# Patient Record
Sex: MALE | Race: WHITE | Employment: UNEMPLOYED | URBAN - METROPOLITAN AREA
[De-identification: names, ages, dates, MRNs, and addresses within clinical notes are randomized per-mention and may not be internally consistent; named-entity substitution may affect disease eponyms.]

---

## 2019-05-29 ENCOUNTER — APPOINTMENT (OUTPATIENT)
Dept: CT IMAGING | Age: 49
End: 2019-05-29
Attending: EMERGENCY MEDICINE
Payer: SELF-PAY

## 2019-05-29 ENCOUNTER — HOSPITAL ENCOUNTER (EMERGENCY)
Age: 49
Discharge: HOME | End: 2019-05-29
Attending: EMERGENCY MEDICINE | Admitting: EMERGENCY MEDICINE
Payer: SELF-PAY

## 2019-05-29 VITALS
TEMPERATURE: 98.4 F | OXYGEN SATURATION: 98 % | SYSTOLIC BLOOD PRESSURE: 142 MMHG | HEART RATE: 64 BPM | BODY MASS INDEX: 24.44 KG/M2 | WEIGHT: 165 LBS | HEIGHT: 69 IN | DIASTOLIC BLOOD PRESSURE: 80 MMHG | RESPIRATION RATE: 16 BRPM

## 2019-05-29 DIAGNOSIS — K40.90 LEFT INGUINAL HERNIA: Primary | ICD-10-CM

## 2019-05-29 DIAGNOSIS — N28.89 RIGHT RENAL MASS: ICD-10-CM

## 2019-05-29 LAB
ALBUMIN SERPL-MCNC: 3.4 G/DL (ref 3.5–5)
ALBUMIN/GLOB SERPL: 1 {RATIO} (ref 1.2–3.5)
ALP SERPL-CCNC: 51 U/L (ref 50–136)
ALT SERPL-CCNC: 16 U/L (ref 12–65)
ANION GAP SERPL CALC-SCNC: 7 MMOL/L (ref 7–16)
AST SERPL-CCNC: 11 U/L (ref 15–37)
BASOPHILS # BLD: 0.1 K/UL (ref 0–0.2)
BASOPHILS NFR BLD: 1 % (ref 0–2)
BILIRUB SERPL-MCNC: 0.3 MG/DL (ref 0.2–1.1)
BUN SERPL-MCNC: 13 MG/DL (ref 6–23)
CALCIUM SERPL-MCNC: 8.5 MG/DL (ref 8.3–10.4)
CHLORIDE SERPL-SCNC: 105 MMOL/L (ref 98–107)
CO2 SERPL-SCNC: 29 MMOL/L (ref 21–32)
CREAT SERPL-MCNC: 0.82 MG/DL (ref 0.8–1.5)
DIFFERENTIAL METHOD BLD: NORMAL
EOSINOPHIL # BLD: 0.2 K/UL (ref 0–0.8)
EOSINOPHIL NFR BLD: 3 % (ref 0.5–7.8)
ERYTHROCYTE [DISTWIDTH] IN BLOOD BY AUTOMATED COUNT: 12.5 % (ref 11.9–14.6)
GLOBULIN SER CALC-MCNC: 3.5 G/DL (ref 2.3–3.5)
GLUCOSE SERPL-MCNC: 95 MG/DL (ref 65–100)
HCT VFR BLD AUTO: 42.3 % (ref 41.1–50.3)
HGB BLD-MCNC: 14.1 G/DL (ref 13.6–17.2)
IMM GRANULOCYTES # BLD AUTO: 0 K/UL (ref 0–0.5)
IMM GRANULOCYTES NFR BLD AUTO: 0 % (ref 0–5)
LIPASE SERPL-CCNC: 142 U/L (ref 73–393)
LYMPHOCYTES # BLD: 2.3 K/UL (ref 0.5–4.6)
LYMPHOCYTES NFR BLD: 34 % (ref 13–44)
MCH RBC QN AUTO: 29.5 PG (ref 26.1–32.9)
MCHC RBC AUTO-ENTMCNC: 33.3 G/DL (ref 31.4–35)
MCV RBC AUTO: 88.5 FL (ref 79.6–97.8)
MONOCYTES # BLD: 0.5 K/UL (ref 0.1–1.3)
MONOCYTES NFR BLD: 8 % (ref 4–12)
NEUTS SEG # BLD: 3.7 K/UL (ref 1.7–8.2)
NEUTS SEG NFR BLD: 54 % (ref 43–78)
NRBC # BLD: 0 K/UL (ref 0–0.2)
PLATELET # BLD AUTO: 241 K/UL (ref 150–450)
PMV BLD AUTO: 9.5 FL (ref 9.4–12.3)
POTASSIUM SERPL-SCNC: 3.7 MMOL/L (ref 3.5–5.1)
PROT SERPL-MCNC: 6.9 G/DL (ref 6.3–8.2)
RBC # BLD AUTO: 4.78 M/UL (ref 4.23–5.6)
SODIUM SERPL-SCNC: 141 MMOL/L (ref 136–145)
WBC # BLD AUTO: 6.8 K/UL (ref 4.3–11.1)

## 2019-05-29 PROCEDURE — 74011636320 HC RX REV CODE- 636/320: Performed by: EMERGENCY MEDICINE

## 2019-05-29 PROCEDURE — 83690 ASSAY OF LIPASE: CPT

## 2019-05-29 PROCEDURE — 80053 COMPREHEN METABOLIC PANEL: CPT

## 2019-05-29 PROCEDURE — 81003 URINALYSIS AUTO W/O SCOPE: CPT | Performed by: EMERGENCY MEDICINE

## 2019-05-29 PROCEDURE — 74177 CT ABD & PELVIS W/CONTRAST: CPT

## 2019-05-29 PROCEDURE — 74011000258 HC RX REV CODE- 258: Performed by: EMERGENCY MEDICINE

## 2019-05-29 PROCEDURE — 99284 EMERGENCY DEPT VISIT MOD MDM: CPT | Performed by: EMERGENCY MEDICINE

## 2019-05-29 PROCEDURE — 85025 COMPLETE CBC W/AUTO DIFF WBC: CPT

## 2019-05-29 RX ORDER — DIVALPROEX SODIUM 500 MG/1
TABLET, DELAYED RELEASE ORAL 3 TIMES DAILY
COMMUNITY

## 2019-05-29 RX ORDER — SODIUM CHLORIDE 0.9 % (FLUSH) 0.9 %
10 SYRINGE (ML) INJECTION
Status: COMPLETED | OUTPATIENT
Start: 2019-05-29 | End: 2019-05-29

## 2019-05-29 RX ADMIN — IOPAMIDOL 100 ML: 755 INJECTION, SOLUTION INTRAVENOUS at 18:31

## 2019-05-29 RX ADMIN — DIATRIZOATE MEGLUMINE AND DIATRIZOATE SODIUM 15 ML: 660; 100 LIQUID ORAL; RECTAL at 17:10

## 2019-05-29 RX ADMIN — SODIUM CHLORIDE 100 ML: 900 INJECTION, SOLUTION INTRAVENOUS at 18:31

## 2019-05-29 RX ADMIN — Medication 10 ML: at 18:31

## 2019-05-29 NOTE — ED TRIAGE NOTES
Pt states he has a knot in left groin for about one week. States it is about the size of an egg now and causing pain into leg.

## 2019-05-29 NOTE — ED PROVIDER NOTES
57-year-old male presents with complaint of left inguinal mass. States that it enlarges with standing. Patient denies history of inguinal hernia in the past.  States it is been present over the past several months. Denies nausea, vomiting, fever, chills, dizziness, weakness, dysuria, hematuria, flank pain. Denies testicular swelling or pain. The history is provided by the patient. No  was used. Abdominal Pain    This is a new problem. The current episode started more than 2 days ago. The problem occurs constantly. The problem has not changed since onset. The pain is located in the LLQ. The quality of the pain is cramping. The pain is at a severity of 2/10. The pain is mild. Pertinent negatives include no anorexia, no fever, no belching, no diarrhea, no flatus, no hematochezia, no melena, no nausea, no vomiting, no constipation, no dysuria, no frequency, no hematuria, no headaches, no arthralgias, no myalgias, no trauma, no chest pain, no testicular pain and no back pain. Nothing worsens the pain. The pain is relieved by nothing. Past Medical History:   Diagnosis Date    Psychiatric disorder        History reviewed. No pertinent surgical history. History reviewed. No pertinent family history.     Social History     Socioeconomic History    Marital status: SINGLE     Spouse name: Not on file    Number of children: Not on file    Years of education: Not on file    Highest education level: Not on file   Occupational History    Not on file   Social Needs    Financial resource strain: Not on file    Food insecurity:     Worry: Not on file     Inability: Not on file    Transportation needs:     Medical: Not on file     Non-medical: Not on file   Tobacco Use    Smoking status: Current Every Day Smoker   Substance and Sexual Activity    Alcohol use: Not Currently    Drug use: Yes     Types: Marijuana    Sexual activity: Not on file   Lifestyle    Physical activity: Days per week: Not on file     Minutes per session: Not on file    Stress: Not on file   Relationships    Social connections:     Talks on phone: Not on file     Gets together: Not on file     Attends Yazidism service: Not on file     Active member of club or organization: Not on file     Attends meetings of clubs or organizations: Not on file     Relationship status: Not on file    Intimate partner violence:     Fear of current or ex partner: Not on file     Emotionally abused: Not on file     Physically abused: Not on file     Forced sexual activity: Not on file   Other Topics Concern    Not on file   Social History Narrative    Not on file         ALLERGIES: Patient has no known allergies. Review of Systems   Constitutional: Negative for fatigue and fever. HENT: Negative. Respiratory: Negative for cough and shortness of breath. Cardiovascular: Negative for chest pain and palpitations. Gastrointestinal: Positive for abdominal pain. Negative for anorexia, blood in stool, constipation, diarrhea, flatus, hematochezia, melena, nausea, rectal pain and vomiting. Genitourinary: Negative for dysuria, frequency, hematuria and testicular pain. Musculoskeletal: Negative for arthralgias, back pain and myalgias. Skin: Negative for color change, pallor and rash. Neurological: Negative for weakness, light-headedness and headaches. Vitals:    05/29/19 1344   BP: 134/83   Pulse: 72   Resp: 16   Temp: 98.6 °F (37 °C)   SpO2: 96%   Weight: 74.8 kg (165 lb)   Height: 5' 9\" (1.753 m)            Physical Exam   Constitutional: He is oriented to person, place, and time. He appears well-developed. Patient well-appearing and in no acute distress. HENT:   Head: Normocephalic. Mouth/Throat: Oropharynx is clear and moist.   MMM. Eyes: Pupils are equal, round, and reactive to light. Conjunctivae and EOM are normal.   Neck: Neck supple. No JVD present.    Cardiovascular: Normal rate, regular rhythm and normal heart sounds. Radial pulses 2+ and equal bilaterally. Pulmonary/Chest: Effort normal and breath sounds normal.   Abdominal: Soft. Bowel sounds are normal. He exhibits no distension. There is no tenderness. Reducible left inguinal hernia. No overlying color change. No significant tenderness to palpation. Soft, NTND. No CVAT. No rebound or guarding. Genitourinary:   Genitourinary Comments: Patient deferred. Musculoskeletal: Normal range of motion. He exhibits no edema. Neurological: He is alert and oriented to person, place, and time. No cranial nerve deficit or sensory deficit. Strength 5/5 throughout. Normal sensory exam.     Skin: Skin is warm and dry. No rash. Psychiatric: He has a normal mood and affect. Nursing note and vitals reviewed. MDM  Number of Diagnoses or Management Options  Left inguinal hernia: new and requires workup  Right renal mass: new and requires workup  Diagnosis management comments: Vital signs stable. All labs normal.  UA negative for UTI. CT abdomen and pelvis negative for obstruction, inguinal hernia. CT does show cased evidence of a right renal lesion. Called radiology in regards to this. States the patient will need outpatient follow-up with urology in 2-3 months. Patient given strict instructions to follow up with urology, general surgery, and primary care physician. Urology referral line to be contacted to ensure follow-up. Patient given parenteral CT scan.          Amount and/or Complexity of Data Reviewed  Clinical lab tests: ordered and reviewed  Tests in the radiology section of CPT®: ordered and reviewed  Tests in the medicine section of CPT®: reviewed and ordered  Review and summarize past medical records: yes  Independent visualization of images, tracings, or specimens: yes    Risk of Complications, Morbidity, and/or Mortality  Presenting problems: moderate  Diagnostic procedures: low  Management options: low    Patient Progress  Patient progress: stable    ED Course as of May 29 1940   Wed May 29, 2019   1912 CT abd/pelvis IMPRESSION:  1. Complex lower pole right renal lesion incompletely characterized. A benign or  malignant etiology could account for this appearance. Urological consultation is  recommended. A CT scan using a renal mass protocol may be beneficial if there is  further clinical concern. 2. No evidence of acute abnormality. [DF]      ED Course User Index  [DF] Quan Queen MD       Procedures      Results Include:    Recent Results (from the past 24 hour(s))   CBC WITH AUTOMATED DIFF    Collection Time: 05/29/19  3:22 PM   Result Value Ref Range    WBC 6.8 4.3 - 11.1 K/uL    RBC 4.78 4.23 - 5.6 M/uL    HGB 14.1 13.6 - 17.2 g/dL    HCT 42.3 41.1 - 50.3 %    MCV 88.5 79.6 - 97.8 FL    MCH 29.5 26.1 - 32.9 PG    MCHC 33.3 31.4 - 35.0 g/dL    RDW 12.5 11.9 - 14.6 %    PLATELET 671 413 - 466 K/uL    MPV 9.5 9.4 - 12.3 FL    ABSOLUTE NRBC 0.00 0.0 - 0.2 K/uL    DF AUTOMATED      NEUTROPHILS 54 43 - 78 %    LYMPHOCYTES 34 13 - 44 %    MONOCYTES 8 4.0 - 12.0 %    EOSINOPHILS 3 0.5 - 7.8 %    BASOPHILS 1 0.0 - 2.0 %    IMMATURE GRANULOCYTES 0 0.0 - 5.0 %    ABS. NEUTROPHILS 3.7 1.7 - 8.2 K/UL    ABS. LYMPHOCYTES 2.3 0.5 - 4.6 K/UL    ABS. MONOCYTES 0.5 0.1 - 1.3 K/UL    ABS. EOSINOPHILS 0.2 0.0 - 0.8 K/UL    ABS. BASOPHILS 0.1 0.0 - 0.2 K/UL    ABS. IMM.  GRANS. 0.0 0.0 - 0.5 K/UL   METABOLIC PANEL, COMPREHENSIVE    Collection Time: 05/29/19  3:22 PM   Result Value Ref Range    Sodium 141 136 - 145 mmol/L    Potassium 3.7 3.5 - 5.1 mmol/L    Chloride 105 98 - 107 mmol/L    CO2 29 21 - 32 mmol/L    Anion gap 7 7 - 16 mmol/L    Glucose 95 65 - 100 mg/dL    BUN 13 6 - 23 MG/DL    Creatinine 0.82 0.8 - 1.5 MG/DL    GFR est AA >60 >60 ml/min/1.73m2    GFR est non-AA >60 >60 ml/min/1.73m2    Calcium 8.5 8.3 - 10.4 MG/DL    Bilirubin, total 0.3 0.2 - 1.1 MG/DL    ALT (SGPT) 16 12 - 65 U/L    AST (SGOT) 11 (L) 15 - 37 U/L    Alk. phosphatase 51 50 - 136 U/L    Protein, total 6.9 6.3 - 8.2 g/dL    Albumin 3.4 (L) 3.5 - 5.0 g/dL    Globulin 3.5 2.3 - 3.5 g/dL    A-G Ratio 1.0 (L) 1.2 - 3.5     LIPASE    Collection Time: 05/29/19  3:22 PM   Result Value Ref Range    Lipase 142 73 - 393 U/L              Chip Kruegr MD; 5/29/2019 @5:56 PM Voice dictation software was used during the making of this note. This software is not perfect and grammatical and other typographical errors may be present.   This note has not been proofread for errors.  ===================================================================

## 2019-05-29 NOTE — ED NOTES
I have reviewed discharge instructions with the patient. The patient verbalized understanding. Patient left ED via Discharge Method: ambulatory to Home with himself. Opportunity for questions and clarification provided. Patient given 0 scripts. To continue your aftercare when you leave the hospital, you may receive an automated call from our care team to check in on how you are doing. This is a free service and part of our promise to provide the best care and service to meet your aftercare needs.  If you have questions, or wish to unsubscribe from this service please call [phone]. Thank you for Choosing our [facility].

## 2019-05-29 NOTE — DISCHARGE INSTRUCTIONS
Follow-up with General surgery in regards to left inguinal hernia. Follow-up with urology in regards to right renal lesion. Will need repeat CT urogram renal mass protocol and 2-3 months. Return to ED if symptoms worsen or progress in any way. Hernia: Care Instructions  Your Care Instructions    A hernia develops when tissue bulges through a weak spot in the wall of your belly. The groin area and the navel are common areas for a hernia. A hernia can also develop near the area of a surgery you had before. Pressure from lifting, straining, or coughing can tear the weak area, causing the hernia to bulge and be painful. If you cannot push a hernia back into place, the tissue may become trapped outside the belly wall. If the hernia gets twisted and loses its blood supply, it will swell and die. This is called a strangulated hernia. It usually causes a lot of pain. It needs treatment right away. Some hernias need to be repaired to prevent a strangulated hernia. If your hernia causes symptoms or is large, you may need surgery. Follow-up care is a key part of your treatment and safety. Be sure to make and go to all appointments, and call your doctor if you are having problems. It's also a good idea to know your test results and keep a list of the medicines you take. How can you care for yourself at home? · Take care when lifting heavy objects. · Stay at a healthy weight. · Do not smoke. Smoking can cause coughing, which can cause your hernia to bulge. If you need help quitting, talk to your doctor about stop-smoking programs and medicines. These can increase your chances of quitting for good. · Talk with your doctor before wearing a corset or truss for a hernia. These devices are not recommended for treating hernias and sometimes can do more harm than good. There may be certain situations when your doctor thinks a truss would work, but these are rare. When should you call for help?   Call your doctor now or seek immediate medical care if:    · You have new or worse belly pain.     · You are vomiting.     · You cannot pass stools or gas.     · You cannot push the hernia back into place with gentle pressure when you are lying down.     · The area over the hernia turns red or becomes tender.    Watch closely for changes in your health, and be sure to contact your doctor if you have any problems. Where can you learn more? Go to http://kristian-bertrand.info/. Enter C129 in the search box to learn more about \"Hernia: Care Instructions. \"  Current as of: March 27, 2018  Content Version: 11.9  © 3240-7963 [employer]. Care instructions adapted under license by [employer] (which disclaims liability or warranty for this information). If you have questions about a medical condition or this instruction, always ask your healthcare professional. [place] [address] any warranty or liability for your use of this information. Patient Education        Inguinal Hernia: Care Instructions  Your Care Instructions    An inguinal hernia occurs when tissue bulges through a weak spot in your groin area. You may see or feel a tender bulge in the groin or scrotum. You may also have pain, pressure or burning, or a feeling that something has \"given way. \"  Hernias are caused by a weakness in the belly wall. The bulge or discomfort may occur after heavy lifting, straining, or coughing. Hernias do not heal on their own, and they tend to get worse over time. If your hernia does not bother you, you most likely can wait to have surgery. Your hernia may get worse, but it may not. In some cases, hernias that are small and painless may never need to be repaired. Follow-up care is a key part of your treatment and safety. Be sure to make and go to all appointments, and call your doctor if you are having problems.  It's also a good idea to know your test results and keep a list of the medicines you take. How can you care for yourself at home? · Take pain medicines exactly as directed. ? If the doctor gave you a prescription medicine for pain, take it as prescribed. ? If you are not taking a prescription pain medicine, ask your doctor if you can take an over-the-counter medicine. · Use proper lifting techniques, and avoid heavy lifting if you can. To lift things more safely, bend your knees and let your arms and legs do the work. Keep your back straight, and do not bend over at the waist. Keep the load as close to your body as you can. Move your feet instead of turning or twisting your body. · Lose weight if you are overweight. · Include fruits, vegetables, legumes, and whole grains in your diet each day. These foods are high in fiber and will make it easier to avoid straining during bowel movements. · Do not smoke. Smoking can cause coughing, which can cause your hernia to bulge. If you need help quitting, talk to your doctor about stop-smoking programs and medicines. These can increase your chances of quitting for good. When should you call for help? Call your doctor now or seek immediate medical care if:    · You have new or worse belly pain.     · You are vomiting.     · You cannot pass stools or gas.     · You cannot push the hernia back into place with gentle pressure when you are lying down.     · The area over the hernia turns red or becomes tender.    Watch closely for changes in your health, and be sure to contact your doctor if you have any problems. Where can you learn more? Go to http://kristian-bertrand.info/. Enter N639 in the search box to learn more about \"Inguinal Hernia: Care Instructions. \"  Current as of: March 27, 2018  Content Version: 11.9  © 1486-4440 [employer]. Care instructions adapted under license by [employer] (which disclaims liability or warranty for this information).  If you have questions about a medical condition or this instruction, always ask your healthcare professional. [place] [address] any warranty or liability for your use of this information.

## 2019-06-13 PROBLEM — K40.90 LEFT INGUINAL HERNIA: Status: ACTIVE | Noted: 2019-06-13

## 2019-07-10 PROBLEM — Z91.199 NO-SHOW FOR APPOINTMENT: Status: ACTIVE | Noted: 2019-07-10

## 2019-08-19 ENCOUNTER — HOSPITAL ENCOUNTER (OUTPATIENT)
Dept: CT IMAGING | Age: 49
Discharge: HOME | End: 2019-08-19
Attending: UROLOGY
Payer: SELF-PAY

## 2019-08-19 DIAGNOSIS — N28.89 RENAL MASS: ICD-10-CM

## 2019-08-19 PROCEDURE — 74150 CT ABDOMEN W/O CONTRAST: CPT

## 2019-08-19 NOTE — PROGRESS NOTES
Lesion is more than likely benign. Needs CT abd without/with contrast in 6 mo and visit to see me to discuss.

## 2020-01-27 ENCOUNTER — HOSPITAL ENCOUNTER (EMERGENCY)
Age: 50
Discharge: HOME | End: 2020-01-27
Attending: EMERGENCY MEDICINE
Payer: SELF-PAY

## 2020-01-27 ENCOUNTER — APPOINTMENT (OUTPATIENT)
Dept: ULTRASOUND IMAGING | Age: 50
End: 2020-01-27
Attending: EMERGENCY MEDICINE
Payer: SELF-PAY

## 2020-01-27 VITALS
OXYGEN SATURATION: 99 % | RESPIRATION RATE: 18 BRPM | DIASTOLIC BLOOD PRESSURE: 85 MMHG | BODY MASS INDEX: 24.44 KG/M2 | HEIGHT: 69 IN | TEMPERATURE: 97.8 F | WEIGHT: 165 LBS | HEART RATE: 65 BPM | SYSTOLIC BLOOD PRESSURE: 138 MMHG

## 2020-01-27 DIAGNOSIS — N50.3 EPIDIDYMAL CYST: Primary | ICD-10-CM

## 2020-01-27 LAB
BACTERIA URNS QL MICRO: 0 /HPF
CASTS URNS QL MICRO: NORMAL /LPF
EPI CELLS #/AREA URNS HPF: 0 /HPF
RBC #/AREA URNS HPF: NORMAL /HPF
WBC URNS QL MICRO: NORMAL /HPF

## 2020-01-27 PROCEDURE — 81003 URINALYSIS AUTO W/O SCOPE: CPT

## 2020-01-27 PROCEDURE — 99284 EMERGENCY DEPT VISIT MOD MDM: CPT

## 2020-01-27 PROCEDURE — 76870 US EXAM SCROTUM: CPT

## 2020-01-27 PROCEDURE — 81015 MICROSCOPIC EXAM OF URINE: CPT

## 2020-01-27 RX ORDER — BUPROPION HYDROCHLORIDE 75 MG/1
150 TABLET ORAL 2 TIMES DAILY
COMMUNITY

## 2020-01-27 NOTE — ED TRIAGE NOTES
Pt has an extra lump in his scrotum. States he found it 3 days ago and his doctor told him to come here to have it checked. Pt also states it feels like another testicle.

## 2020-01-28 NOTE — DISCHARGE INSTRUCTIONS
Call Dr. Dianna Buitrago, your urologist for appointment to recheck. Recheck for pain fever or difficulty urination. Patient Education        Spermatocele: Care Instructions  Your Care Instructions    A spermatocele is a collection of sperm in the scrotum that forms a lump. These lumps are common and are more likely to form as a man gets older. They do not turn into cancer. Doctors don't know what causes them. A blockage in the tube that carries sperm through the scrotum can cause sperm to collect. The blockage can be caused by an injury to the scrotum or an infection. But most of the time, the cause is not found. You may need only pain medicine. But if the lump is large or very painful, you may have surgery to remove it. Or your doctor may inject medicine into the lump. This can make it go away. But another lump can form after treatment. Follow-up care is a key part of your treatment and safety. Be sure to make and go to all appointments, and call your doctor if you are having problems. It's also a good idea to know your test results and keep a list of the medicines you take. How can you care for yourself at home? · Take your medicine exactly as prescribed. If your doctor prescribed antibiotics, take them as directed. Do not stop taking them just because you feel better. You need to take the full course of antibiotics. · Take pain medicines exactly as directed. ? If the doctor gave you a prescription medicine for pain, take it as prescribed. ? If you are not taking a prescription pain medicine, take an over-the-counter medicine such as acetaminophen (Tylenol), ibuprofen (Advil, Motrin), or naproxen (Aleve). Be safe with medicines. Read and follow all instructions on the label. ? Do not take two or more pain medicines at the same time unless the doctor told you to. Many pain medicines have acetaminophen, which is Tylenol. Too much acetaminophen (Tylenol) can be harmful.   · Learn how to check your testicles so you can see if the lump changes. This can also help you find a spermatocele that comes back after treatment. When should you call for help? Call your doctor now or seek immediate medical care if:    · You have bleeding or discharge from your penis.     · Your urine is cloudy or smells bad.     · You have pain in your pubic area.    Watch closely for changes in your health, and be sure to contact your doctor if:    · You do not get better as expected. Where can you learn more? Go to http://kristian-bertrand.info/. Enter E522 in the search box to learn more about \"Spermatocele: Care Instructions. \"  Current as of: December 19, 2018  Content Version: 12.2  © 5860-1697 [employer], [employer]. Care instructions adapted under license by [employer] (which disclaims liability or warranty for this information). If you have questions about a medical condition or this instruction, always ask your healthcare professional. [address] any warranty or liability for your use of this information.

## 2020-01-28 NOTE — ED NOTES
I have reviewed discharge instructions with the patient. The patient verbalized understanding. Patient left ED via Discharge Method: ambulatory to Home with self. Opportunity for questions and clarification provided. Patient given 0 scripts. To continue your aftercare when you leave the hospital, you may receive an automated call from our care team to check in on how you are doing. This is a free service and part of our promise to provide the best care and service to meet your aftercare needs.  If you have questions, or wish to unsubscribe from this service please call [phone]. Thank you for Choosing our [facility] Emergency Department.

## 2021-12-10 NOTE — ED PROVIDER NOTES
[address] [place] [address] Emergency Department  Arrival Date/Time: No admission date for patient encounter. Goldy Kahn  MRN: [id number]    YOB: 1970   44 y.o. male    [facility] EMERGENCY DEPT Room/bed info not found  Seen on 1/27/2020 @ 4:28 PM      Today's Chief Complaint:   Chief Complaint   Patient presents with    Testicle Pain       TRIAGE Provider NOTE: Patient states he noticed an extra testicle on Friday. He called his doctor and they told him to go to the emergency room to be evaluated for possible testicular cancer. No other complaints. Mari Peterson MD; 1/27/2020 @4:28 PM============================     Goldy Kahn is a 44 y.o. male seen on 1/27/2020 at 4:28 PM in the [address] [place] [address] DEPT   HPI:    75-year-old male states he noticed a testicular mass about 2 to 3 days ago. Seems to be enlarging. Minimal pain. No dysuria hematuria. No recent trauma. Does have a abnormal cyst on his kidney and that is requiring CAT scan every 6 months. No dysuria or hematuria. The history is provided by the patient. Testicle Pain   This is a new problem. The current episode started more than 2 days ago. The problem occurs constantly. The problem has been gradually worsening. Primary symptoms include testicular mass, swelling and scrotal pain. Pertinent negatives include no dysuria, no penile discharge, no penile pain, no priapism and no inability to urinate. Pertinent negatives include no nausea, no vomiting, no abdominal pain, no frequency and no flank pain. There has been no fever. He has tried nothing for the symptoms. Review of Systems: Review of Systems   Constitutional: Negative for chills and fever. Gastrointestinal: Negative for abdominal pain, nausea and vomiting. Genitourinary: Positive for testicular pain. Negative for dysuria, flank pain, frequency, genital sores, hematuria, penile discharge and penile pain. Musculoskeletal: Negative for back pain.    Skin: Negative for color change and rash. Past Medical History: Primary Care Doctor: Mally Dey MD  Meds, PMH, PSHx, SocHx at end of this note     Allergies: No Known Allergies      Key Anti-Platelet Anticoagulant Meds     The patient is on no antiplatelet meds or anticoagulants. Physical Exam:  Nursing documentation reviewed. There were no vitals filed for this visit. Vital signs were reviewed. Physical Exam  Vitals signs and nursing note reviewed. Constitutional:       Appearance: He is not ill-appearing. Abdominal:      General: Abdomen is flat. Tenderness: There is no abdominal tenderness. Genitourinary:     Penis: Normal.       Scrotum/Testes: Swelling present. Right: Mass, tenderness and swelling present. Epididymis:      Right: Mass present. Comments: Mildly tender anterior laterally on the testicle with mass-effect. Not extremely hard. Somewhat fluctuant and mobile. Neurological:      Mental Status: He is alert. MEDICAL DECISION MAKING:   Differential Diagnosis:    MDM  Number of Diagnoses or Management Options  Diagnosis management comments: Possible spermatocele, hydrocele, epididymal cysts. Ultrasound to check for tumor or abscess. Amount and/or Complexity of Data Reviewed  Clinical lab tests: ordered and reviewed  Tests in the radiology section of CPT®: ordered and reviewed    Risk of Complications, Morbidity, and/or Mortality  Presenting problems: moderate  Diagnostic procedures: minimal  Management options: low    Patient Progress  Patient progress: stable        Data:      Lab findings during this visit: No results found for this or any previous visit (from the past 24 hour(s)).      Radiology studies during this visit:   No orders to display        Medications given in the ED: Medications - No data to display    Procedure Documentation: Procedures     Assessment and Plan:      Other ED Course Notes:        Past Medical History:      Past Medical History:   Diagnosis Date    Kidney stone     Psychiatric disorder      History reviewed. No pertinent surgical history. Social History     Tobacco Use    Smoking status: Current Every Day Smoker    Smokeless tobacco: Never Used   Substance Use Topics    Alcohol use: Not Currently    Drug use: Yes     Types: Marijuana     Home Medication:   Cannot display prior to admission medications because the patient has not been admitted in this contact. Us Scrotum/testicles    Result Date: 1/27/2020  Scrotal ultrasound COMPARISON: None INDICATION: Right testicular mass TECHNIQUE: Gray-scale, color Doppler, and spectral waveform tracings were obtained of the scrotum. FINDINGS: Both testicles are normal in echogenicity, contour and size and demonstrate symmetric color Doppler flow. Right measures 3.9 x 2.2 x 3.6 cm and the left measures 4.4 x 2.2 x 3.0 cm. No intratesticular mass is seen. There is a 3.9 x 1.7 x 3.7 cm complex/septated cyst involving the right epididymal tail, in the region of palpable concern. The left epididymis appears within normal limits. There is left varicocele. IMPRESSION: 1. Large complex/septated left epididymal cyst. Urology referral is advised. 2. Left varicocele. 3. Testicles appear within normal limits. No intratesticular mass is seen. No evidence of testicular torsion or epididymoorchitis. [facility]     Patient has urologist.  No treatment this time. Refer back to urologist for further testing. No